# Patient Record
Sex: FEMALE | Race: WHITE | NOT HISPANIC OR LATINO
[De-identification: names, ages, dates, MRNs, and addresses within clinical notes are randomized per-mention and may not be internally consistent; named-entity substitution may affect disease eponyms.]

---

## 2023-06-20 ENCOUNTER — NON-APPOINTMENT (OUTPATIENT)
Age: 38
End: 2023-06-20

## 2023-06-28 ENCOUNTER — APPOINTMENT (OUTPATIENT)
Dept: OBGYN | Facility: CLINIC | Age: 38
End: 2023-06-28
Payer: COMMERCIAL

## 2023-06-28 ENCOUNTER — TRANSCRIPTION ENCOUNTER (OUTPATIENT)
Age: 38
End: 2023-06-28

## 2023-06-28 VITALS
HEART RATE: 98 BPM | WEIGHT: 126 LBS | HEIGHT: 66 IN | DIASTOLIC BLOOD PRESSURE: 84 MMHG | BODY MASS INDEX: 20.25 KG/M2 | SYSTOLIC BLOOD PRESSURE: 146 MMHG | OXYGEN SATURATION: 99 %

## 2023-06-28 DIAGNOSIS — Z12.39 ENCOUNTER FOR OTHER SCREENING FOR MALIGNANT NEOPLASM OF BREAST: ICD-10-CM

## 2023-06-28 DIAGNOSIS — Z78.9 OTHER SPECIFIED HEALTH STATUS: ICD-10-CM

## 2023-06-28 DIAGNOSIS — Z11.3 ENCOUNTER FOR SCREENING FOR INFECTIONS WITH A PREDOMINANTLY SEXUAL MODE OF TRANSMISSION: ICD-10-CM

## 2023-06-28 DIAGNOSIS — Z80.3 FAMILY HISTORY OF MALIGNANT NEOPLASM OF BREAST: ICD-10-CM

## 2023-06-28 DIAGNOSIS — Z01.419 ENCOUNTER FOR GYNECOLOGICAL EXAMINATION (GENERAL) (ROUTINE) W/OUT ABNORMAL FINDINGS: ICD-10-CM

## 2023-06-28 DIAGNOSIS — Z86.19 PERSONAL HISTORY OF OTHER INFECTIOUS AND PARASITIC DISEASES: ICD-10-CM

## 2023-06-28 DIAGNOSIS — Z12.4 ENCOUNTER FOR SCREENING FOR MALIGNANT NEOPLASM OF CERVIX: ICD-10-CM

## 2023-06-28 PROBLEM — Z00.00 ENCOUNTER FOR PREVENTIVE HEALTH EXAMINATION: Status: ACTIVE | Noted: 2023-06-28

## 2023-06-28 PROCEDURE — 99385 PREV VISIT NEW AGE 18-39: CPT

## 2023-06-28 NOTE — PHYSICAL EXAM
[Chaperone Present] : A chaperone was present in the examining room during all aspects of the physical examination [Appropriately responsive] : appropriately responsive [Alert] : alert [No Acute Distress] : no acute distress [No Lymphadenopathy] : no lymphadenopathy [Soft] : soft [Non-tender] : non-tender [Non-distended] : non-distended [No HSM] : No HSM [No Lesions] : no lesions [No Mass] : no mass [Oriented x3] : oriented x3 [Examination Of The Breasts] : a normal appearance [No Masses] : no breast masses were palpable [Labia Majora] : normal [Labia Minora] : normal [Discharge] : a  ~M vaginal discharge was present [Moderate] : moderate [Renetta] : yellow [Thin] : thin [Normal] : normal [Uterine Adnexae] : normal [FreeTextEntry5] : No increased work of breathing

## 2023-06-28 NOTE — HISTORY OF PRESENT ILLNESS
[Y] : Patient is sexually active [N] : Patient denies prior pregnancies [Currently Active] : currently active [No] : No [FreeTextEntry1] : Patient presents for initial office visit. \par Has sensitivity on left side of vuvla/vagina on the skin. Worsens with \par [place] HPV 66 [place], and she did have genital warts and had laser to the skin area and they went away.\par Not currently on hormonal contraception, does not desire.\par LMP 6/5/2023, gets regular cycles.\par Not interested immediately in pregnancy but wants to discuss options.\par \par \par \par -----------------------------------------------------------------------------------------------------------------------------------------\par PMH: \par L5/S1 back pain \par \par PSH:\par Back surgery, 2015, still in pain\par \par OB Hx: G0, never TTC\par \par GYN Hx:\par Last pap smear 1/2023, normal per pt, no h/o abnormal \par Did not receive Gardasil vaccine, desires vaccination\par Has small fibroids 1-2 cm, no issues with them\par Denies h/o ovarian cysts\par Has h/o genital warts, no other STDs\par No breast issues, never had breast imaging\par Not currently on hormonal contraception, does not desire\par Menarche @ 14 y/o, now after 31 y/o gets regular cycles q 30 days, last 5 days, some pain takes OTC meds with some relief, not heavy\par \par Family Hx:\par Mother - breast cancer, dx 56 y/o, still alive\par \par Meds: \par Diculfate for pain\par \par Allergies: NKDA\par \par Social Hx:\par Never a smoker\par Social alcohol use \par No drug use \par Single, not in a relationship\par 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

## 2023-06-28 NOTE — COUNSELING
[Vitamins/Supplements] : vitamins/supplements [Drugs/Alcohol] : drugs, alcohol [Breast Self Exam] : breast self exam [Contraception/ Emergency Contraception/ Safe Sexual Practices] : contraception, emergency contraception, safe sexual practices [Preconception Care/ Fertility options] : preconception care, fertility options [Confidentiality] : confidentiality [STD (testing, results, tx)] : STD (testing, results, tx) [Vaccines] : vaccines [HPV Vaccine] : HPV Vaccine

## 2023-06-28 NOTE — PLAN
[FreeTextEntry1] : \par \par \par \par \par \par \par 37 y/o G0 here to establish GYN care, due for annual exam\par H/o genital warts and is feeling some LEFT vaginal skin pain \par # WWE\par - Pap smear and HPV testing done\par - GC/CT cxs via pap done\par - STD blood work panel declined\par - Discussed HPV vaccine - pt has not received R/B/A of vaccination discussed, questions answered, pt desires to be vaccinated.  Vaccine sent to pharmacy.\par - Breast exam done and WNL\par - Age-related screening tests and timing discussed\par # Vaginal irritation\par - Pt says she is prone to getting yeast and BV infections, has started taking a probiotic\par - Discharge on speculum exam - vaginitis swab collected\par - Vulvovaginal hygiene discussed\par - No lesions seen \par - Pt told to return if she feels any growths on skin \par # Fertility\par - Thinking about egg freezing

## 2023-07-06 LAB
C TRACH RRNA SPEC QL NAA+PROBE: NOT DETECTED
CANDIDA VAG CYTO: NOT DETECTED
CYTOLOGY CVX/VAG DOC THIN PREP: NORMAL
G VAGINALIS+PREV SP MTYP VAG QL MICRO: DETECTED
HPV HIGH+LOW RISK DNA PNL CVX: NOT DETECTED
N GONORRHOEA RRNA SPEC QL NAA+PROBE: NOT DETECTED
SOURCE TP AMPLIFICATION: NORMAL
T VAGINALIS VAG QL WET PREP: NOT DETECTED

## 2023-07-06 RX ORDER — METRONIDAZOLE 7.5 MG/G
0.75 GEL VAGINAL
Qty: 1 | Refills: 0 | Status: ACTIVE | COMMUNITY
Start: 2023-07-06 | End: 1900-01-01

## 2023-12-08 ENCOUNTER — APPOINTMENT (OUTPATIENT)
Dept: OBGYN | Facility: CLINIC | Age: 38
End: 2023-12-08
Payer: SELF-PAY

## 2023-12-08 VITALS — HEART RATE: 80 BPM | OXYGEN SATURATION: 98 % | SYSTOLIC BLOOD PRESSURE: 113 MMHG | DIASTOLIC BLOOD PRESSURE: 79 MMHG

## 2023-12-08 DIAGNOSIS — N89.8 OTHER SPECIFIED NONINFLAMMATORY DISORDERS OF VAGINA: ICD-10-CM

## 2023-12-08 DIAGNOSIS — N76.0 ACUTE VAGINITIS: ICD-10-CM

## 2023-12-08 DIAGNOSIS — B96.89 ACUTE VAGINITIS: ICD-10-CM

## 2023-12-08 PROCEDURE — 99213 OFFICE O/P EST LOW 20 MIN: CPT

## 2023-12-18 LAB
A VAGINAE DNA VAG QL NAA+PROBE: ABNORMAL
BVAB2 DNA VAG QL NAA+PROBE: NORMAL
C KRUSEI DNA VAG QL NAA+PROBE: NEGATIVE
C TRACH RRNA SPEC QL NAA+PROBE: NEGATIVE
CANDIDA DNA VAG QL NAA+PROBE: NEGATIVE
MEGA1 DNA VAG QL NAA+PROBE: NORMAL
N GONORRHOEA RRNA SPEC QL NAA+PROBE: NEGATIVE
T VAGINALIS RRNA SPEC QL NAA+PROBE: NEGATIVE

## 2024-01-29 PROBLEM — N76.0 BACTERIAL VAGINOSIS: Status: ACTIVE | Noted: 2023-07-06

## 2024-01-29 PROBLEM — N76.0 RECURRENT VAGINITIS: Status: ACTIVE | Noted: 2024-01-29

## 2024-01-29 PROBLEM — N89.8 VAGINAL DISCHARGE: Status: ACTIVE | Noted: 2023-06-28

## 2024-01-29 NOTE — HISTORY OF PRESENT ILLNESS
[FreeTextEntry1] : MIRIAM Saldivar 37yo Female Presents to the office for Possible Vaginal Infection Sx Presented: Yellowish, thick discharge Pt denies vaginal odor, burning and itching  She was taking antibiotics for eye infection 2-3 weeks ago. Then got discharge after stopped taking antibiotics - took 1 pill of diflucan and made it a bit better. Now feeling like still having d/c. Denies fever/chills/abnormal vaginal bleeding. Last genital wart outbreak 1-2 years inside [facility].  [PapSmeardate] : 06/2023

## 2024-01-29 NOTE — COUNSELING
[Nutrition/ Exercise/ Weight Management] : nutrition, exercise, weight management [Vitamins/Supplements] : vitamins/supplements [Bladder Hygiene] : bladder hygiene [Confidentiality] : confidentiality [STD (testing, results, tx)] : STD (testing, results, tx)

## 2024-01-29 NOTE — PLAN
[FreeTextEntry1] : Here for c/o vaginal discharge and vaginal irritation. Vaginal and vulvar hygiene discussed. Vaginitis swab collected. Rx metronidazole sent to pharmacy. Pt has h/o recurrent BV

## 2024-11-20 ENCOUNTER — APPOINTMENT (OUTPATIENT)
Dept: OBGYN | Facility: CLINIC | Age: 39
End: 2024-11-20

## 2024-11-20 VITALS
SYSTOLIC BLOOD PRESSURE: 151 MMHG | BODY MASS INDEX: 20.18 KG/M2 | HEART RATE: 81 BPM | OXYGEN SATURATION: 100 % | DIASTOLIC BLOOD PRESSURE: 68 MMHG | WEIGHT: 125 LBS

## 2024-11-20 DIAGNOSIS — Z12.39 ENCOUNTER FOR OTHER SCREENING FOR MALIGNANT NEOPLASM OF BREAST: ICD-10-CM

## 2024-11-20 DIAGNOSIS — N89.8 OTHER SPECIFIED NONINFLAMMATORY DISORDERS OF VAGINA: ICD-10-CM

## 2024-11-20 DIAGNOSIS — Z12.4 ENCOUNTER FOR SCREENING FOR MALIGNANT NEOPLASM OF CERVIX: ICD-10-CM

## 2024-11-20 DIAGNOSIS — Z01.419 ENCOUNTER FOR GYNECOLOGICAL EXAMINATION (GENERAL) (ROUTINE) W/OUT ABNORMAL FINDINGS: ICD-10-CM

## 2024-11-20 DIAGNOSIS — A59.01 TRICHOMONAL VULVOVAGINITIS: ICD-10-CM

## 2024-11-20 DIAGNOSIS — Z11.3 ENCOUNTER FOR SCREENING FOR INFECTIONS WITH A PREDOMINANTLY SEXUAL MODE OF TRANSMISSION: ICD-10-CM

## 2024-11-20 DIAGNOSIS — B37.9 CANDIDIASIS, UNSPECIFIED: ICD-10-CM

## 2024-11-20 DIAGNOSIS — Z31.41 ENCOUNTER FOR FERTILITY TESTING: ICD-10-CM

## 2024-11-20 PROCEDURE — 99395 PREV VISIT EST AGE 18-39: CPT

## 2024-11-20 PROCEDURE — 36415 COLL VENOUS BLD VENIPUNCTURE: CPT

## 2024-11-21 LAB
ANTI-MUELLERIAN HORMONE: 2.63 NG/ML
BV BACTERIA RRNA VAG QL NAA+PROBE: NOT DETECTED
C GLABRATA RNA VAG QL NAA+PROBE: NOT DETECTED
C TRACH RRNA SPEC QL NAA+PROBE: NOT DETECTED
CANDIDA RRNA VAG QL PROBE: NOT DETECTED
HIV1+2 AB SPEC QL IA.RAPID: NONREACTIVE
HPV HIGH+LOW RISK DNA PNL CVX: NOT DETECTED
N GONORRHOEA RRNA SPEC QL NAA+PROBE: NOT DETECTED
T VAGINALIS RRNA SPEC QL NAA+PROBE: NOT DETECTED

## 2024-11-25 LAB — CYTOLOGY CVX/VAG DOC THIN PREP: NORMAL

## 2024-12-04 ENCOUNTER — NON-APPOINTMENT (OUTPATIENT)
Age: 39
End: 2024-12-04